# Patient Record
Sex: FEMALE | Race: WHITE | NOT HISPANIC OR LATINO
[De-identification: names, ages, dates, MRNs, and addresses within clinical notes are randomized per-mention and may not be internally consistent; named-entity substitution may affect disease eponyms.]

---

## 2017-03-10 ENCOUNTER — APPOINTMENT (OUTPATIENT)
Dept: FAMILY MEDICINE | Facility: CLINIC | Age: 57
End: 2017-03-10

## 2017-03-10 ENCOUNTER — NON-APPOINTMENT (OUTPATIENT)
Age: 57
End: 2017-03-10

## 2017-03-10 VITALS
DIASTOLIC BLOOD PRESSURE: 62 MMHG | SYSTOLIC BLOOD PRESSURE: 118 MMHG | TEMPERATURE: 98 F | WEIGHT: 145.6 LBS | HEART RATE: 66 BPM | RESPIRATION RATE: 14 BRPM | BODY MASS INDEX: 23.4 KG/M2 | OXYGEN SATURATION: 98 % | HEIGHT: 66 IN

## 2017-03-18 LAB
25(OH)D3 SERPL-MCNC: 44 NG/ML
VIT B12 SERPL-MCNC: 546 PG/ML

## 2017-03-24 ENCOUNTER — APPOINTMENT (OUTPATIENT)
Dept: RADIOLOGY | Facility: IMAGING CENTER | Age: 57
End: 2017-03-24

## 2017-03-24 ENCOUNTER — APPOINTMENT (OUTPATIENT)
Dept: MAMMOGRAPHY | Facility: IMAGING CENTER | Age: 57
End: 2017-03-24

## 2017-03-24 ENCOUNTER — OUTPATIENT (OUTPATIENT)
Dept: OUTPATIENT SERVICES | Facility: HOSPITAL | Age: 57
LOS: 1 days | End: 2017-03-24
Payer: COMMERCIAL

## 2017-03-24 DIAGNOSIS — Z13.820 ENCOUNTER FOR SCREENING FOR OSTEOPOROSIS: ICD-10-CM

## 2017-03-24 DIAGNOSIS — Z12.31 ENCOUNTER FOR SCREENING MAMMOGRAM FOR MALIGNANT NEOPLASM OF BREAST: ICD-10-CM

## 2017-03-24 PROCEDURE — 77063 BREAST TOMOSYNTHESIS BI: CPT

## 2017-03-24 PROCEDURE — 77067 SCR MAMMO BI INCL CAD: CPT

## 2017-03-24 PROCEDURE — 77080 DXA BONE DENSITY AXIAL: CPT

## 2017-03-30 DIAGNOSIS — M81.0 AGE-RELATED OSTEOPOROSIS WITHOUT CURRENT PATHOLOGICAL FRACTURE: ICD-10-CM

## 2017-03-30 DIAGNOSIS — Z12.31 ENCOUNTER FOR SCREENING MAMMOGRAM FOR MALIGNANT NEOPLASM OF BREAST: ICD-10-CM

## 2017-04-07 ENCOUNTER — APPOINTMENT (OUTPATIENT)
Dept: FAMILY MEDICINE | Facility: CLINIC | Age: 57
End: 2017-04-07

## 2017-04-07 VITALS — DIASTOLIC BLOOD PRESSURE: 60 MMHG | TEMPERATURE: 97.8 F | SYSTOLIC BLOOD PRESSURE: 108 MMHG

## 2017-04-07 DIAGNOSIS — Z86.19 PERSONAL HISTORY OF OTHER INFECTIOUS AND PARASITIC DISEASES: ICD-10-CM

## 2017-04-07 DIAGNOSIS — R30.0 DYSURIA: ICD-10-CM

## 2017-04-07 DIAGNOSIS — A60.09 HERPESVIRAL INFECTION OF OTHER UROGENITAL TRACT: ICD-10-CM

## 2017-04-07 DIAGNOSIS — B37.9 CANDIDIASIS, UNSPECIFIED: ICD-10-CM

## 2017-04-07 DIAGNOSIS — Z11.3 ENCOUNTER FOR SCREENING FOR INFECTIONS WITH A PREDOMINANTLY SEXUAL MODE OF TRANSMISSION: ICD-10-CM

## 2017-04-13 LAB
APPEARANCE: CLEAR
BACTERIA UR CULT: NORMAL
BACTERIA: NEGATIVE
BILIRUBIN URINE: NEGATIVE
BLOOD URINE: NEGATIVE
CANDIDA VAG CYTO: NOT DETECTED
COLOR: YELLOW
G VAGINALIS+PREV SP MTYP VAG QL MICRO: NOT DETECTED
GLUCOSE QUALITATIVE U: NORMAL MG/DL
HERPES SIMPLEX 1 AND 2 ABS IGM: 1.76 RATIO
HSV 1+2 IGG SER IA-IMP: NEGATIVE
HSV 1+2 IGG SER IA-IMP: NEGATIVE
HSV1 IGG SER QL: 0.24 INDEX
HSV2 IGG SER QL: 0.05 INDEX
HYALINE CASTS: 0 /LPF
KETONES URINE: NEGATIVE
LEUKOCYTE ESTERASE URINE: NEGATIVE
MICROSCOPIC-UA: NORMAL
NITRITE URINE: NEGATIVE
PH URINE: 7.5
PROTEIN URINE: NEGATIVE MG/DL
RED BLOOD CELLS URINE: 2 /HPF
RPR SER QL: NORMAL
SOURCE AMPLIFICATION: NORMAL
SPECIFIC GRAVITY URINE: 1.02
SQUAMOUS EPITHELIAL CELLS: 2 /HPF
T VAGINALIS RRNA SPEC QL NAA+PROBE: NEGATIVE
T VAGINALIS VAG QL WET PREP: NOT DETECTED
UROBILINOGEN URINE: NORMAL MG/DL
WHITE BLOOD CELLS URINE: 1 /HPF

## 2017-04-19 ENCOUNTER — APPOINTMENT (OUTPATIENT)
Dept: OBGYN | Facility: CLINIC | Age: 57
End: 2017-04-19

## 2017-04-19 VITALS
SYSTOLIC BLOOD PRESSURE: 102 MMHG | BODY MASS INDEX: 23.46 KG/M2 | DIASTOLIC BLOOD PRESSURE: 68 MMHG | HEART RATE: 67 BPM | WEIGHT: 146 LBS | HEIGHT: 66 IN

## 2017-04-19 DIAGNOSIS — Z01.419 ENCOUNTER FOR GYNECOLOGICAL EXAMINATION (GENERAL) (ROUTINE) W/OUT ABNORMAL FINDINGS: ICD-10-CM

## 2017-04-19 DIAGNOSIS — Z87.39 PERSONAL HISTORY OF OTHER DISEASES OF THE MUSCULOSKELETAL SYSTEM AND CONNECTIVE TISSUE: ICD-10-CM

## 2017-04-19 DIAGNOSIS — Z80.8 FAMILY HISTORY OF MALIGNANT NEOPLASM OF OTHER ORGANS OR SYSTEMS: ICD-10-CM

## 2017-04-19 DIAGNOSIS — Z82.49 FAMILY HISTORY OF ISCHEMIC HEART DISEASE AND OTHER DISEASES OF THE CIRCULATORY SYSTEM: ICD-10-CM

## 2017-04-19 DIAGNOSIS — Z80.1 FAMILY HISTORY OF MALIGNANT NEOPLASM OF TRACHEA, BRONCHUS AND LUNG: ICD-10-CM

## 2017-04-19 DIAGNOSIS — Z80.0 FAMILY HISTORY OF MALIGNANT NEOPLASM OF DIGESTIVE ORGANS: ICD-10-CM

## 2017-04-21 LAB — HPV HIGH+LOW RISK DNA PNL CVX: NEGATIVE

## 2017-04-25 LAB — CYTOLOGY CVX/VAG DOC THIN PREP: NORMAL

## 2017-06-29 ENCOUNTER — APPOINTMENT (OUTPATIENT)
Dept: OBGYN | Facility: CLINIC | Age: 57
End: 2017-06-29

## 2017-06-30 ENCOUNTER — CLINICAL ADVICE (OUTPATIENT)
Age: 57
End: 2017-06-30

## 2017-06-30 RX ORDER — CHROMIUM 200 MCG
TABLET ORAL
Refills: 0 | Status: ACTIVE | COMMUNITY

## 2018-03-12 ENCOUNTER — APPOINTMENT (OUTPATIENT)
Dept: FAMILY MEDICINE | Facility: CLINIC | Age: 58
End: 2018-03-12
Payer: COMMERCIAL

## 2018-03-12 ENCOUNTER — NON-APPOINTMENT (OUTPATIENT)
Age: 58
End: 2018-03-12

## 2018-03-12 VITALS
HEIGHT: 65 IN | TEMPERATURE: 98 F | HEART RATE: 60 BPM | SYSTOLIC BLOOD PRESSURE: 120 MMHG | RESPIRATION RATE: 17 BRPM | DIASTOLIC BLOOD PRESSURE: 70 MMHG | WEIGHT: 150 LBS | BODY MASS INDEX: 24.99 KG/M2 | OXYGEN SATURATION: 99 %

## 2018-03-12 DIAGNOSIS — K59.09 OTHER CONSTIPATION: ICD-10-CM

## 2018-03-12 PROCEDURE — 99396 PREV VISIT EST AGE 40-64: CPT | Mod: 25

## 2018-03-12 PROCEDURE — 93000 ELECTROCARDIOGRAM COMPLETE: CPT

## 2018-07-22 PROBLEM — Z80.0 FAMILY HISTORY OF COLON CANCER: Status: ACTIVE | Noted: 2017-04-19

## 2018-07-22 PROBLEM — Z80.8 FAMILY HISTORY OF SKIN CANCER: Status: ACTIVE | Noted: 2017-04-19

## 2018-07-24 ENCOUNTER — APPOINTMENT (OUTPATIENT)
Dept: ENDOCRINOLOGY | Facility: CLINIC | Age: 58
End: 2018-07-24
Payer: COMMERCIAL

## 2018-07-24 VITALS
BODY MASS INDEX: 24.16 KG/M2 | OXYGEN SATURATION: 98 % | HEART RATE: 59 BPM | WEIGHT: 145 LBS | DIASTOLIC BLOOD PRESSURE: 70 MMHG | SYSTOLIC BLOOD PRESSURE: 96 MMHG | HEIGHT: 65 IN

## 2018-07-24 PROCEDURE — 77080 DXA BONE DENSITY AXIAL: CPT

## 2018-07-24 PROCEDURE — 99245 OFF/OP CONSLTJ NEW/EST HI 55: CPT | Mod: 25

## 2018-07-24 RX ORDER — FLUCONAZOLE 150 MG/1
150 TABLET ORAL
Qty: 1 | Refills: 0 | Status: DISCONTINUED | COMMUNITY
Start: 2017-04-07 | End: 2018-07-24

## 2018-07-31 ENCOUNTER — APPOINTMENT (OUTPATIENT)
Dept: ENDOCRINOLOGY | Facility: CLINIC | Age: 58
End: 2018-07-31

## 2018-08-21 ENCOUNTER — APPOINTMENT (OUTPATIENT)
Dept: ENDOCRINOLOGY | Facility: CLINIC | Age: 58
End: 2018-08-21

## 2018-09-26 ENCOUNTER — APPOINTMENT (OUTPATIENT)
Dept: ENDOCRINOLOGY | Facility: CLINIC | Age: 58
End: 2018-09-26

## 2018-10-29 ENCOUNTER — APPOINTMENT (OUTPATIENT)
Dept: ENDOCRINOLOGY | Facility: CLINIC | Age: 58
End: 2018-10-29

## 2018-11-27 ENCOUNTER — APPOINTMENT (OUTPATIENT)
Dept: ENDOCRINOLOGY | Facility: CLINIC | Age: 58
End: 2018-11-27
Payer: COMMERCIAL

## 2018-11-27 PROCEDURE — 99213 OFFICE O/P EST LOW 20 MIN: CPT

## 2018-11-27 RX ORDER — VALACYCLOVIR 500 MG/1
500 TABLET, FILM COATED ORAL
Qty: 20 | Refills: 0 | Status: DISCONTINUED | COMMUNITY
Start: 2017-04-07 | End: 2018-11-27

## 2018-11-27 RX ORDER — TERCONAZOLE 8 MG/G
0.8 CREAM VAGINAL
Qty: 1 | Refills: 0 | Status: DISCONTINUED | COMMUNITY
Start: 2017-04-07 | End: 2018-11-27

## 2018-12-20 ENCOUNTER — APPOINTMENT (OUTPATIENT)
Dept: ENDOCRINOLOGY | Facility: CLINIC | Age: 58
End: 2018-12-20
Payer: SUBSIDIZED

## 2018-12-20 PROCEDURE — 99212 OFFICE O/P EST SF 10 MIN: CPT

## 2019-01-23 ENCOUNTER — APPOINTMENT (OUTPATIENT)
Dept: ENDOCRINOLOGY | Facility: CLINIC | Age: 59
End: 2019-01-23
Payer: SUBSIDIZED

## 2019-01-23 PROCEDURE — 99212 OFFICE O/P EST SF 10 MIN: CPT

## 2019-01-23 NOTE — END OF VISIT
[FreeTextEntry3] : I, Guillermina Lara, authored this note working as a medical scribe for Dr. Messina.  01/23/2019. 12:15PM. \par This note was authored by Guillermina Lara working as medical scribe for me. I have reviewed, edited, and revised the note as needed. I am in agreement with the exam findings, imaging findings, and treatment plan.  Everardo Messina MD

## 2019-01-23 NOTE — HISTORY OF PRESENT ILLNESS
[FreeTextEntry1] : 58 year old female with osteoporosis. \par \par  arrived for [employer] M[employer] Research study- visit. \par Exercising 5x/wk. She intentionally lost 5 lbs. She feels slightly weaker in her muscles when she is exercising. I would not make any changes/ modifications based on this. \par She was told of periodontal pockets at last DDS. \par Pt takes Ca and Vit D daily as consistent with protocol. \par Up to date with gyn and mammos. \par \par Last dose of Romosozumab administered today. No complications. \par f/u in Feb with labs and BMD. \par

## 2019-02-19 ENCOUNTER — APPOINTMENT (OUTPATIENT)
Dept: ENDOCRINOLOGY | Facility: CLINIC | Age: 59
End: 2019-02-19
Payer: COMMERCIAL

## 2019-02-19 VITALS
WEIGHT: 146 LBS | SYSTOLIC BLOOD PRESSURE: 120 MMHG | TEMPERATURE: 98.1 F | BODY MASS INDEX: 24.03 KG/M2 | HEART RATE: 61 BPM | DIASTOLIC BLOOD PRESSURE: 70 MMHG | RESPIRATION RATE: 17 BRPM | HEIGHT: 65.5 IN | OXYGEN SATURATION: 98 %

## 2019-02-19 PROCEDURE — ZZZZZ: CPT

## 2019-02-19 PROCEDURE — 99214 OFFICE O/P EST MOD 30 MIN: CPT | Mod: 25

## 2019-02-19 PROCEDURE — 77080 DXA BONE DENSITY AXIAL: CPT

## 2019-02-19 NOTE — HISTORY OF PRESENT ILLNESS
[Vitamin D (supplements)] : Vitamin D as a dietary supplement [Family History of Osteoporosis] : family history of osteoporosis [Regular Dental Follow-Up] : regular dental follow-up [FreeTextEntry1] : 58 year old female with osteoporosis. \par \par Routine bone density indicated osteoporosis March 2017 Spine: -3.3 Total hip: -2.3 Fem Neck: -1.9, images reviewed. Pt saw  Dr. Sade Peralta and was told not to start any medication. No hx of fx. FHx of severe osteoporosis (mother, sister). Mother had multiple fx and bone CA. Sister is on Forteo. Pt on Bess trial study since July 2018. Took correctly. Tolerated well. No local irritation. She feels less achy over all. On Vit D and Ca. Sl wt loss after she felt lack of wt change despite exercising 5x/week. No interval fx. Protocol completed. \par  [Low Calcium Intake] : no past or present history of low calcium intake [Low Vit D Intake/Exposure] : no past or present history of low vitamin D intake [Taking Steroids] : no past or present history of taking steroids [Kidney Stones] : no history of kidney stones [High Fall Risk] : no fall risk [Family History of Hip Fracture] : no family history of hip fracture [Previous Fragility Fracture] : no previous fragility fracture

## 2019-02-19 NOTE — ASSESSMENT
[FreeTextEntry1] : 58 year old female with osteoporosis. \par \par Routine bone density indicated decrease in bone density consistent with osteoporosis. March 2017 Spine: -3.3 Total hip: -2.3 Fem Neck: -1.9, images reviewed. BMD 7/2018 indicates worsening osteoporosis, severe in the proximal radius and spine and moderately low in the hip. No h/o fx. FHx of severe osteoporosis (mother, sister) with multiple fx and bone CA. Pt has been part of the Bess trial since July 2018. Tolerated well. Repeat BMD 2/2019 indicates trending improvement in all sites. Completed Protocol. No interval fx. \par \par Options of osteoporosis rx to transition to discussed. Information given to read about standard orl bisphosphonate treatment. Risks and benefits discussed. All questions were answered. f/u in 3 mons to further discuss long term treatment options.

## 2019-02-19 NOTE — PROCEDURE
[FreeTextEntry1] : Bone mineral density: 02/19/2019 \par Indication: vs. 2018, assess response to medication for research\par Spine: -3.0 osteoporosis (+9.4%)\par Total hip: -2.2 osteopenia (+4.1%)\par Femoral neck: -1.8 osteopenia (+3.8%)\par Proximal radius: -3.3 osteoporosis (+3%)\par \par Bone mineral density: 07/24/2018 \par Spine: -3.5 osteoporosis \par Total hip: -2.5 osteoporosis \par Femoral neck: -2.0 osteopenia \par Proximal radius: - 3.6 osteoporosis

## 2019-02-19 NOTE — END OF VISIT
[FreeTextEntry3] : I, Guillermina Lara, authored this note working as a medical scribe for Dr. Messina.  02/19/2019. 12:00PM. \par This note was authored by Guillermina Lara working as medical scribe for me. I have reviewed, edited, and revised the note as needed. I am in agreement with the exam findings, imaging findings, and treatment plan.  Everardo Messina MD

## 2019-05-22 ENCOUNTER — APPOINTMENT (OUTPATIENT)
Dept: ENDOCRINOLOGY | Facility: CLINIC | Age: 59
End: 2019-05-22

## 2019-06-12 ENCOUNTER — APPOINTMENT (OUTPATIENT)
Dept: ENDOCRINOLOGY | Facility: CLINIC | Age: 59
End: 2019-06-12

## 2019-08-08 ENCOUNTER — APPOINTMENT (OUTPATIENT)
Dept: FAMILY MEDICINE | Facility: CLINIC | Age: 59
End: 2019-08-08
Payer: COMMERCIAL

## 2019-08-08 ENCOUNTER — NON-APPOINTMENT (OUTPATIENT)
Age: 59
End: 2019-08-08

## 2019-08-08 VITALS
HEART RATE: 71 BPM | TEMPERATURE: 98.1 F | OXYGEN SATURATION: 97 % | WEIGHT: 153 LBS | RESPIRATION RATE: 17 BRPM | SYSTOLIC BLOOD PRESSURE: 100 MMHG | HEIGHT: 65.35 IN | DIASTOLIC BLOOD PRESSURE: 70 MMHG | BODY MASS INDEX: 25.19 KG/M2

## 2019-08-08 PROCEDURE — 93000 ELECTROCARDIOGRAM COMPLETE: CPT

## 2019-08-08 PROCEDURE — 99396 PREV VISIT EST AGE 40-64: CPT | Mod: 25

## 2019-08-08 NOTE — ADDENDUM
[FreeTextEntry1] : I, Tennille Yañez, acted solely as a scribe for Dr. Reyes on this date 08/08/2019.\par \par All medical record entries made by the Scribe were at my, Dr. Reyes, direction and personally dictated by me on 08/08/2019. I have reviewed the chart and agree that the record accurately reflects my personal performance of the history, physical exam, assessment and plan.  I have also personally directed, reviewed and agreed with the chart.

## 2019-08-08 NOTE — REVIEW OF SYSTEMS
[Negative] : Neurological [Fever] : no fever [Chills] : no chills [Chest Pain] : no chest pain [Shortness Of Breath] : no shortness of breath [Nausea] : no nausea [Diarrhea] : diarrhea [Vomiting] : no vomiting [FreeTextEntry4] : itchy ears

## 2019-08-08 NOTE — PHYSICAL EXAM
[Normal] : normal gait, coordination grossly intact, no focal deficits [de-identified] : Warm, dry, intact.  No rashes.  [de-identified] : AA&Ox3

## 2019-08-08 NOTE — HISTORY OF PRESENT ILLNESS
[de-identified] : 59y/o F with PMHx of Osteoporosis presents to the office for routine annual physical exam. Pt f/u with Endo recently, started Romosozumab with improvement. Pt exercises and watches her diet. Pt c/o itchy ears, has tried oil without improvement. Pt recently started Triphala and presents with improvement to previous bowel issues. All routine labs reviewed with patient and WNL except for Vitamin D 38. BP in office is 100/70, Bglu is 89 and HbA1c is 5.2. Last colonoscopy 2015, last mammo UTD, last pap UTD and last bone density UTD. Denies HA, CP, SOB, N/V/D, fever or chills. NKDA.

## 2020-04-02 ENCOUNTER — APPOINTMENT (OUTPATIENT)
Dept: ENDOCRINOLOGY | Facility: CLINIC | Age: 60
End: 2020-04-02

## 2020-09-22 ENCOUNTER — LABORATORY RESULT (OUTPATIENT)
Age: 60
End: 2020-09-22

## 2020-09-22 ENCOUNTER — NON-APPOINTMENT (OUTPATIENT)
Age: 60
End: 2020-09-22

## 2020-09-22 ENCOUNTER — APPOINTMENT (OUTPATIENT)
Dept: FAMILY MEDICINE | Facility: CLINIC | Age: 60
End: 2020-09-22
Payer: COMMERCIAL

## 2020-09-22 VITALS
HEART RATE: 56 BPM | BODY MASS INDEX: 24.76 KG/M2 | SYSTOLIC BLOOD PRESSURE: 106 MMHG | OXYGEN SATURATION: 97 % | WEIGHT: 150.4 LBS | DIASTOLIC BLOOD PRESSURE: 60 MMHG | HEIGHT: 65.35 IN | RESPIRATION RATE: 14 BRPM | TEMPERATURE: 98.1 F

## 2020-09-22 DIAGNOSIS — Z00.00 ENCOUNTER FOR GENERAL ADULT MEDICAL EXAMINATION W/OUT ABNORMAL FINDINGS: ICD-10-CM

## 2020-09-22 DIAGNOSIS — M79.10 MYALGIA, UNSPECIFIED SITE: ICD-10-CM

## 2020-09-22 PROCEDURE — 36415 COLL VENOUS BLD VENIPUNCTURE: CPT

## 2020-09-22 PROCEDURE — 99396 PREV VISIT EST AGE 40-64: CPT | Mod: 25

## 2020-09-22 PROCEDURE — 93000 ELECTROCARDIOGRAM COMPLETE: CPT

## 2020-09-22 NOTE — HISTORY OF PRESENT ILLNESS
[de-identified] : 58 y/o F PMHx Osteoporosis (Actonel) presents to office for routine PE.Pt c/o muscle tightness .Admits to "lymphatic stiffness".  pt does Yoga and meditation daily. Requests Lyme test as she had Lyme 10 years ago and had a rash few weeks ago. Unsure of bit by tick

## 2020-09-22 NOTE — HEALTH RISK ASSESSMENT
[Patient reported PAP Smear was normal] : Patient reported PAP Smear was normal [PapSmearDate] : 9/20

## 2020-09-30 LAB
25(OH)D3 SERPL-MCNC: 41.1 NG/ML
ALBUMIN SERPL ELPH-MCNC: 4.8 G/DL
ALP BLD-CCNC: 62 U/L
ALT SERPL-CCNC: 13 U/L
ANION GAP SERPL CALC-SCNC: 10 MMOL/L
AST SERPL-CCNC: 11 U/L
B BURGDOR IGG+IGM SER QL IB: NORMAL
BASOPHILS # BLD AUTO: 0.04 K/UL
BASOPHILS NFR BLD AUTO: 0.7 %
BILIRUB SERPL-MCNC: 0.4 MG/DL
BUN SERPL-MCNC: 13 MG/DL
CALCIUM SERPL-MCNC: 9.8 MG/DL
CHLORIDE SERPL-SCNC: 103 MMOL/L
CHOLEST SERPL-MCNC: 214 MG/DL
CHOLEST/HDLC SERPL: 2.9 RATIO
CO2 SERPL-SCNC: 24 MMOL/L
CREAT SERPL-MCNC: 0.81 MG/DL
EOSINOPHIL # BLD AUTO: 0.08 K/UL
EOSINOPHIL NFR BLD AUTO: 1.4 %
ERYTHROCYTE [SEDIMENTATION RATE] IN BLOOD BY WESTERGREN METHOD: < 2 MM/HR
ESTIMATED AVERAGE GLUCOSE: 108 MG/DL
FOLATE SERPL-MCNC: 17.1 NG/ML
GLUCOSE SERPL-MCNC: 94 MG/DL
HBA1C MFR BLD HPLC: 5.4 %
HCT VFR BLD CALC: 40.9 %
HDLC SERPL-MCNC: 73 MG/DL
HGB BLD-MCNC: 13.2 G/DL
IMM GRANULOCYTES NFR BLD AUTO: 0.2 %
LDLC SERPL CALC-MCNC: 129 MG/DL
LYMPHOCYTES # BLD AUTO: 2.1 K/UL
LYMPHOCYTES NFR BLD AUTO: 36.4 %
MAN DIFF?: NORMAL
MCHC RBC-ENTMCNC: 30.3 PG
MCHC RBC-ENTMCNC: 32.3 GM/DL
MCV RBC AUTO: 93.8 FL
MONOCYTES # BLD AUTO: 0.46 K/UL
MONOCYTES NFR BLD AUTO: 8 %
NEUTROPHILS # BLD AUTO: 3.08 K/UL
NEUTROPHILS NFR BLD AUTO: 53.3 %
PLATELET # BLD AUTO: 260 K/UL
POTASSIUM SERPL-SCNC: 4.2 MMOL/L
PROT SERPL-MCNC: 7.3 G/DL
RBC # BLD: 4.36 M/UL
RBC # FLD: 12.7 %
SODIUM SERPL-SCNC: 137 MMOL/L
TRIGL SERPL-MCNC: 61 MG/DL
TSH SERPL-ACNC: 1.62 UIU/ML
VIT B12 SERPL-MCNC: 713 PG/ML
WBC # FLD AUTO: 5.77 K/UL

## 2020-12-15 PROBLEM — Z86.19 HISTORY OF CANDIDAL VULVOVAGINITIS: Status: RESOLVED | Noted: 2017-04-07 | Resolved: 2020-12-15

## 2021-01-05 ENCOUNTER — APPOINTMENT (OUTPATIENT)
Dept: ENDOCRINOLOGY | Facility: CLINIC | Age: 61
End: 2021-01-05
Payer: COMMERCIAL

## 2021-01-05 VITALS
TEMPERATURE: 97.4 F | HEART RATE: 59 BPM | BODY MASS INDEX: 25.16 KG/M2 | DIASTOLIC BLOOD PRESSURE: 68 MMHG | HEIGHT: 65 IN | SYSTOLIC BLOOD PRESSURE: 110 MMHG | WEIGHT: 151 LBS | OXYGEN SATURATION: 98 %

## 2021-01-05 PROCEDURE — 99072 ADDL SUPL MATRL&STAF TM PHE: CPT

## 2021-01-05 PROCEDURE — ZZZZZ: CPT

## 2021-01-05 PROCEDURE — 99214 OFFICE O/P EST MOD 30 MIN: CPT | Mod: 25

## 2021-01-05 PROCEDURE — 77080 DXA BONE DENSITY AXIAL: CPT

## 2021-01-06 NOTE — HISTORY OF PRESENT ILLNESS
[Vitamin D (supplements)] : Vitamin D as a dietary supplement [Family History of Osteoporosis] : family history of osteoporosis [Regular Dental Follow-Up] : regular dental follow-up [FreeTextEntry1] : Last seen 2/2019. \par Routine bone density indicated osteoporosis March 2017 Spine: -3.3 Total hip: -2.3 Fem Neck: -1.9, images reviewed.  No hx of fx. FHx of severe osteoporosis (mother, sister). Mother had multiple fx and bone CA. Sister is on Forteo. Pt on Bess trial study since July 2018. Took correctly. Tolerated well. No local irritation. She feels less achy over all. On Vit D and Ca. Sl wt loss after she felt lack of wt change despite exercising 5x/week. No interval fx. Protocol completed. \par Began risedronate 2/2019 taking correctly tolerating well No UGI sx. No thigh pain. Last DDS one  mos ago. No interval fractures. \par  [Low Calcium Intake] : no past or present history of low calcium intake [Low Vit D Intake/Exposure] : no past or present history of low vitamin D intake [Taking Steroids] : no past or present history of taking steroids [Kidney Stones] : no history of kidney stones [High Fall Risk] : no fall risk [Family History of Hip Fracture] : no family history of hip fracture [Previous Fragility Fracture] : no previous fragility fracture

## 2021-01-06 NOTE — PHYSICAL EXAM
[Normal Oropharynx] : the oropharynx was normal [No Respiratory Distress] : no respiratory distress [No Accessory Muscle Use] : no accessory muscle use [Pedal Pulses Normal] : the pedal pulses are present [Normal Posterior Cervical Nodes] : no posterior cervical lymphadenopathy [Normal Strength/Tone] : muscle strength and tone were normal [Alert] : alert [Well Nourished] : well nourished [No Acute Distress] : no acute distress [Well Developed] : well developed [Normal Sclera/Conjunctiva] : normal sclera/conjunctiva [EOMI] : extra ocular movement intact [No Proptosis] : no proptosis [Thyroid Not Enlarged] : the thyroid was not enlarged [No Thyroid Nodules] : no palpable thyroid nodules [Clear to Auscultation] : lungs were clear to auscultation bilaterally [Normal S1, S2] : normal S1 and S2 [Normal Rate] : heart rate was normal [Regular Rhythm] : with a regular rhythm [No Edema] : no peripheral edema [Normal Bowel Sounds] : normal bowel sounds [Not Tender] : non-tender [Not Distended] : not distended [Soft] : abdomen soft [Normal Anterior Cervical Nodes] : no anterior cervical lymphadenopathy [No Spinal Tenderness] : no spinal tenderness [Spine Straight] : spine straight [No Stigmata of Cushings Syndrome] : no stigmata of Cushings Syndrome [Normal Gait] : normal gait [No Rash] : no rash [Normal Reflexes] : deep tendon reflexes were 2+ and symmetric [No Tremors] : no tremors [Oriented x3] : oriented to person, place, and time [Acanthosis Nigricans] : no acanthosis nigricans [Negative] : Heme/Lymph

## 2021-01-06 NOTE — PHYSICAL EXAM
[Normal Oropharynx] : the oropharynx was normal [No Respiratory Distress] : no respiratory distress [No Accessory Muscle Use] : no accessory muscle use [Pedal Pulses Normal] : the pedal pulses are present [Normal Posterior Cervical Nodes] : no posterior cervical lymphadenopathy [Normal Strength/Tone] : muscle strength and tone were normal [Alert] : alert [Well Nourished] : well nourished [No Acute Distress] : no acute distress [Well Developed] : well developed [Normal Sclera/Conjunctiva] : normal sclera/conjunctiva [EOMI] : extra ocular movement intact [No Proptosis] : no proptosis [Thyroid Not Enlarged] : the thyroid was not enlarged [No Thyroid Nodules] : no palpable thyroid nodules [Clear to Auscultation] : lungs were clear to auscultation bilaterally [Normal S1, S2] : normal S1 and S2 [Normal Rate] : heart rate was normal [Regular Rhythm] : with a regular rhythm [No Edema] : no peripheral edema [Normal Bowel Sounds] : normal bowel sounds [Not Tender] : non-tender [Not Distended] : not distended [Soft] : abdomen soft [Normal Anterior Cervical Nodes] : no anterior cervical lymphadenopathy [No Spinal Tenderness] : no spinal tenderness [Spine Straight] : spine straight [No Stigmata of Cushings Syndrome] : no stigmata of Cushings Syndrome [Normal Gait] : normal gait [No Rash] : no rash [Normal Reflexes] : deep tendon reflexes were 2+ and symmetric [No Tremors] : no tremors [Oriented x3] : oriented to person, place, and time [Acanthosis Nigricans] : no acanthosis nigricans

## 2021-01-06 NOTE — PROCEDURE
[FreeTextEntry1] : Bone mineral density January 5, 2021\par indication: Compared to 2019\par spine spine -3.1 osteoporosis no significant change\par total hip -2.3 osteopenia no significant change\par femoral neck -1.9 osteopenia no significant change\par proximal radius -3.2 osteoporosis no significant change\par \par Bone mineral density: 02/19/2019 \par Indication: vs. 2018, assess response to medication for research\par Spine: -3.0 osteoporosis (+9.4%)\par Total hip: -2.2 osteopenia (+4.1%)\par Femoral neck: -1.8 osteopenia (+3.8%)\par Proximal radius: -3.3 osteoporosis (+3%)\par \par Bone mineral density: 07/24/2018 \par Spine: -3.5 osteoporosis \par Total hip: -2.5 osteoporosis \par Femoral neck: -2.0 osteopenia \par Proximal radius: - 3.6 osteoporosis

## 2021-01-06 NOTE — ASSESSMENT
[FreeTextEntry1] : 60 year old female with h/o severe  osteoporosis. \par \par Routine bone density indicated decrease in bone density consistent with osteoporosis. March 2017 Spine: -3.3 Total hip: -2.3 Fem Neck: -1.9, images reviewed. BMD 7/2018 indicates worsening osteoporosis, severe in the proximal radius and spine and moderately low in the hip. No h/o fx. FHx of severe osteoporosis (mother, sister) with multiple fx and bone CA. Pt has been part of the Bess trial since July 2018. Tolerated well. Repeat BMD 2/2019 indicates trending improvement in all sites. Completed Protocol. No interval fx. \par Began risedronate which she is taking correctly and tolerating well.\par Repeat bone density 2021 is essentially stable after initial improvement especially spine while on research protocol.  Options of therapy discussed in great detail.  Patient elects to continue risedronate.

## 2021-01-21 ENCOUNTER — APPOINTMENT (OUTPATIENT)
Dept: OPHTHALMOLOGY | Facility: CLINIC | Age: 61
End: 2021-01-21
Payer: COMMERCIAL

## 2021-01-21 ENCOUNTER — NON-APPOINTMENT (OUTPATIENT)
Age: 61
End: 2021-01-21

## 2021-01-21 PROCEDURE — 99072 ADDL SUPL MATRL&STAF TM PHE: CPT

## 2021-01-21 PROCEDURE — 92014 COMPRE OPH EXAM EST PT 1/>: CPT

## 2021-01-21 PROCEDURE — 92015 DETERMINE REFRACTIVE STATE: CPT

## 2021-02-02 ENCOUNTER — APPOINTMENT (OUTPATIENT)
Dept: FAMILY MEDICINE | Facility: CLINIC | Age: 61
End: 2021-02-02
Payer: COMMERCIAL

## 2021-02-02 DIAGNOSIS — B02.9 ZOSTER W/OUT COMPLICATIONS: ICD-10-CM

## 2021-02-02 PROCEDURE — 99212 OFFICE O/P EST SF 10 MIN: CPT | Mod: 95

## 2021-02-02 NOTE — PHYSICAL EXAM
[Normal] : affect was normal and insight and judgment were intact [de-identified] : maculaopapaular rash with some vesicles non oozing in RUQ right side and right mid back

## 2021-02-02 NOTE — REVIEW OF SYSTEMS
[Itching] : Itching [Skin Rash] : skin rash [Negative] : Psychiatric [de-identified] : painful rash RUQ,Right side right upper mid back

## 2021-02-02 NOTE — HISTORY OF PRESENT ILLNESS
[Home] : at home, [unfilled] , at the time of the visit. [Medical Office: ([facility])___] : at the medical office located in  [Verbal consent obtained from patient] : the patient, [unfilled] [FreeTextEntry8] : 61 y/o F Presents via Telehealth c/o RUQ pain for few days.Pt went to ER had CT scan WNL.The next day rash came outon right upper abdomen and now spread .Pt was diagnosed with Shingles.Started on Valtrex day 3 (7 doses). Now rash has spread to right mid side and right upper back. No oozing

## 2021-02-11 DIAGNOSIS — B02.29 OTHER POSTHERPETIC NERVOUS SYSTEM INVOLVEMENT: ICD-10-CM

## 2021-02-11 RX ORDER — LIDOCAINE 42 MG/1
4 PATCH PERCUTANEOUS; TOPICAL; TRANSDERMAL
Qty: 5 | Refills: 0 | Status: ACTIVE | COMMUNITY
Start: 2021-02-11 | End: 1900-01-01

## 2021-07-09 ENCOUNTER — NON-APPOINTMENT (OUTPATIENT)
Age: 61
End: 2021-07-09

## 2021-07-26 ENCOUNTER — APPOINTMENT (OUTPATIENT)
Dept: FAMILY MEDICINE | Facility: CLINIC | Age: 61
End: 2021-07-26
Payer: COMMERCIAL

## 2021-07-26 VITALS
OXYGEN SATURATION: 98 % | DIASTOLIC BLOOD PRESSURE: 78 MMHG | SYSTOLIC BLOOD PRESSURE: 106 MMHG | RESPIRATION RATE: 12 BRPM | TEMPERATURE: 98 F | HEART RATE: 57 BPM

## 2021-07-26 DIAGNOSIS — L29.9 PRURITUS, UNSPECIFIED: ICD-10-CM

## 2021-07-26 PROCEDURE — 99072 ADDL SUPL MATRL&STAF TM PHE: CPT

## 2021-07-26 PROCEDURE — 99213 OFFICE O/P EST LOW 20 MIN: CPT

## 2021-07-26 NOTE — HISTORY OF PRESENT ILLNESS
[FreeTextEntry8] : 61 yo female presents to the office for inner ear itching. the patient reports that two weeks ago, she was seen at an urgent care for inner ear congestion/muffled hearing, and was prescribed antibiotic ear drops for 'swimmers ear'. the patient states that her symptoms have mostly resolved, but she still has occasional ear itching. she has been using flonase once a day, but is wondering if there's anything else she can use in addition to help the itching.

## 2021-07-26 NOTE — REVIEW OF SYSTEMS
[Postnasal Drip] : postnasal drip [Negative] : Psychiatric [Earache] : no earache [Hearing Loss] : no hearing loss [Nosebleed] : no nosebleeds [Hoarseness] : no hoarseness [Nasal Discharge] : no nasal discharge [Sore Throat] : no sore throat [FreeTextEntry4] : inner ear itching

## 2021-07-26 NOTE — PHYSICAL EXAM
[Normal TMs] : both tympanic membranes were normal [No Lymphadenopathy] : no lymphadenopathy [Normal Supraclavicular Nodes] : no supraclavicular lymphadenopathy [Coordination Grossly Intact] : coordination grossly intact [No Focal Deficits] : no focal deficits [Normal Gait] : normal gait [Speech Grossly Normal] : speech grossly normal [Alert and Oriented x3] : oriented to person, place, and time [Normal Mood] : the mood was normal [Normal] : affect was normal and insight and judgment were intact

## 2021-11-02 ENCOUNTER — APPOINTMENT (OUTPATIENT)
Dept: FAMILY MEDICINE | Facility: CLINIC | Age: 61
End: 2021-11-02
Payer: COMMERCIAL

## 2021-11-02 VITALS — SYSTOLIC BLOOD PRESSURE: 110 MMHG | TEMPERATURE: 95.5 F | DIASTOLIC BLOOD PRESSURE: 62 MMHG

## 2021-11-02 DIAGNOSIS — M79.2 NEURALGIA AND NEURITIS, UNSPECIFIED: ICD-10-CM

## 2021-11-02 DIAGNOSIS — S03.00XA DISLOCATION OF JAW, UNSPECIFIED SIDE, INITIAL ENCOUNTER: ICD-10-CM

## 2021-11-02 PROCEDURE — 99215 OFFICE O/P EST HI 40 MIN: CPT

## 2021-11-02 PROCEDURE — 99072 ADDL SUPL MATRL&STAF TM PHE: CPT

## 2021-11-02 NOTE — PHYSICAL EXAM
[Normal] : affect was normal and insight and judgment were intact [de-identified] : ++TMJ Click R>.L [de-identified] : decreased sensation to  pinprck left side of face

## 2021-11-02 NOTE — REVIEW OF SYSTEMS
[Muscle Pain] : muscle pain [Negative] : Respiratory [FreeTextEntry4] : TMJ issues R>L, left sided nasal swelling [FreeTextEntry9] : neck pain [de-identified] : numbness tingling sensation in face  L>R
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
## 2021-11-08 ENCOUNTER — APPOINTMENT (OUTPATIENT)
Dept: FAMILY MEDICINE | Facility: CLINIC | Age: 61
End: 2021-11-08
Payer: COMMERCIAL

## 2021-11-08 PROCEDURE — 99443: CPT

## 2021-11-18 ENCOUNTER — APPOINTMENT (OUTPATIENT)
Dept: FAMILY MEDICINE | Facility: CLINIC | Age: 61
End: 2021-11-18
Payer: COMMERCIAL

## 2021-11-18 PROCEDURE — 99443: CPT

## 2021-11-23 ENCOUNTER — APPOINTMENT (OUTPATIENT)
Dept: FAMILY MEDICINE | Facility: CLINIC | Age: 61
End: 2021-11-23

## 2021-12-30 RX ORDER — RISEDRONATE SODIUM 150 MG/1
150 TABLET, FILM COATED ORAL
Qty: 3 | Refills: 3 | Status: ACTIVE | COMMUNITY
Start: 2020-04-01 | End: 1900-01-01

## 2022-03-17 ENCOUNTER — APPOINTMENT (OUTPATIENT)
Dept: ENDOCRINOLOGY | Facility: CLINIC | Age: 62
End: 2022-03-17
Payer: SELF-PAY

## 2022-03-17 VITALS
HEART RATE: 61 BPM | WEIGHT: 143 LBS | DIASTOLIC BLOOD PRESSURE: 62 MMHG | SYSTOLIC BLOOD PRESSURE: 112 MMHG | BODY MASS INDEX: 23.82 KG/M2 | OXYGEN SATURATION: 98 % | TEMPERATURE: 97 F | HEIGHT: 65 IN

## 2022-03-17 DIAGNOSIS — M81.0 AGE-RELATED OSTEOPOROSIS W/OUT CURRENT PATHOLOGICAL FRACTURE: ICD-10-CM

## 2022-03-17 PROCEDURE — 99213 OFFICE O/P EST LOW 20 MIN: CPT | Mod: 25

## 2022-03-17 PROCEDURE — 77080 DXA BONE DENSITY AXIAL: CPT

## 2022-03-17 PROCEDURE — ZZZZZ: CPT

## 2022-03-17 PROCEDURE — 99072 ADDL SUPL MATRL&STAF TM PHE: CPT

## 2022-03-17 RX ORDER — PREDNISONE 10 MG/1
10 TABLET ORAL
Qty: 25 | Refills: 0 | Status: DISCONTINUED | COMMUNITY
Start: 2021-11-02 | End: 2022-03-17

## 2022-03-17 RX ORDER — DIAZEPAM 2 MG/1
2 TABLET ORAL DAILY
Qty: 10 | Refills: 0 | Status: DISCONTINUED | COMMUNITY
Start: 2021-11-02 | End: 2022-03-17

## 2022-03-17 RX ORDER — MOMETASONE FUROATE 1 MG/ML
0.1 SOLUTION TOPICAL
Qty: 1 | Refills: 0 | Status: DISCONTINUED | COMMUNITY
Start: 2021-07-26 | End: 2022-03-17

## 2022-03-17 RX ORDER — GABAPENTIN 100 MG/1
100 CAPSULE ORAL 3 TIMES DAILY
Qty: 90 | Refills: 5 | Status: DISCONTINUED | COMMUNITY
Start: 2021-02-11 | End: 2022-03-17

## 2022-03-17 NOTE — END OF VISIT
[FreeTextEntry3] : I, Sam Harris, authored this note working as a medical scribe for Dr. Messina.  03/17/2022.  2:45PM. This note was authored by the medical scribe for me. I have reviewed, edited, and revised the note as needed. I am in agreement with the exam findings, imaging findings, and treatment plan.  Everardo Messina MD

## 2022-03-17 NOTE — HISTORY OF PRESENT ILLNESS
[Romosozumab (Evenity)] : Romosozumab [Risedronate (Actonel)] : Risedronate [FreeTextEntry1] : No significant interval health changes. No interval surgery, hospitalizations, fractures, or change in medications.\par \par Routine BMD 3/2017 indicated osteoporosis Spine: -3.3 Total hip: -2.3 Fem Neck: -1.9, images reviewed. No h/o fx. Fh/o severe osteoporosis (mother, sister). Mother had multiple fx and bone CA. Sister was on Forteo. Pt previously was on Bess trial study since 7/2018. Took correctly. Tolerated well. No local irritation. She felt achy over all. Protocol completed. Began Risedronate 2/2019. Taking correctly, tolerating well. No interval fx, no UGI sx, no thigh pain. No aches & pains. No heartburn. Last DDS within past 6 months. No ONJ. Not planning major dental work. BMD 1/2021 is essentially stable after initial improvement especially spine while on research protocol. \par \par Had shingles 1/2021, resolved.\par \par Pt moved to [place], [place].

## 2022-03-17 NOTE — ASSESSMENT
[Denosumab Therapy] : Risks  and benefits of denosumab therapy were discussed with the patient including eczema, cellulitis, osteonecrosis of the jaw and atypical femur fractures [FreeTextEntry1] : 61 year old female with h/o severe osteoporosis\par \par Routine bone density indicated decrease in bone density consistent with osteoporosis. March 2017 Spine: -3.3 Total hip: -2.3 Fem Neck: -1.9, images reviewed. BMD 7/2018 indicates worsening osteoporosis, severe in the proximal radius and spine and moderately low in the hip. No h/o fx. Fh/o severe osteoporosis (mother, sister) with multiple fx and bone CA. Pt was previously in the Bess trial since 7/2018. Tolerated well. BMD 2/2019 indicates trending improvement in all sites. Completed Protocol. Began Risedronate 2/2019. Taking correctly, tolerating well. No interval fx, no UGI sx, no thigh pain. No aches & pains. No heartburn. No ONJ. BMD 1/2021 is essentially stable after initial improvement especially spine while on research protocol. BMD 3/2022 indicates stable osteoporosis in spine and proximal radius, slightly worsened now osteoporosis in total hip, and stable osteopenia in femoral neck. BMD results reviewed w/ pt.\par \par Patient advised for an increased risk of future fx. Options for medical therapy discussed in detail including c/w Actonel vs. transitioning to twice a year Prolia. Risks and benefits discussed including thigh pain, interval fx, and ONJ. I discussed that pt cannot stop Prolia without expecting rapid bone loss and increase in risk for future fx unless they transition to another rx. Furthermore, there is 10 year safety data for Prolia. All questions were answered. Rx information handout provided. Pt elects to transition to Prolia. Will investigate insurance coverage and if pt can get rx in [place].\par \par Request labs sent out.\par \par F/u TBD

## 2022-03-17 NOTE — PROCEDURE
[FreeTextEntry1] : Bone mineral density: 03/17/2022 \par Indication: vs. 2021 assess response to medication\par Spine: -3.2 osteoporosis, no significant change\par Total hip: -2.5 osteoporosis, -4.5%\par Femoral neck: -1.9 osteopenia, no significant change\par Proximal radius: -3.2 osteoporosis, no significant change\par \par Bone mineral density January 5, 2021\par indication: Compared to 2019\par spine spine -3.1 osteoporosis no significant change\par total hip -2.3 osteopenia no significant change\par femoral neck -1.9 osteopenia no significant change\par proximal radius -3.2 osteoporosis no significant change\par \par Bone mineral density: 02/19/2019 \par Indication: vs. 2018, assess response to medication for research\par Spine: -3.0 osteoporosis (+9.4%)\par Total hip: -2.2 osteopenia (+4.1%)\par Femoral neck: -1.8 osteopenia (+3.8%)\par Proximal radius: -3.3 osteoporosis (+3%)\par \par Bone mineral density: 07/24/2018 \par Spine: -3.5 osteoporosis \par Total hip: -2.5 osteoporosis \par Femoral neck: -2.0 osteopenia \par Proximal radius: - 3.6 osteoporosis

## 2022-03-18 LAB
25(OH)D3 SERPL-MCNC: 37.4 NG/ML
ALBUMIN SERPL ELPH-MCNC: 4.7 G/DL
ALP BLD-CCNC: 64 U/L
ALT SERPL-CCNC: 13 U/L
ANION GAP SERPL CALC-SCNC: 13 MMOL/L
AST SERPL-CCNC: 14 U/L
BASOPHILS # BLD AUTO: 0.04 K/UL
BASOPHILS NFR BLD AUTO: 0.7 %
BILIRUB SERPL-MCNC: 0.3 MG/DL
BUN SERPL-MCNC: 12 MG/DL
CALCIUM SERPL-MCNC: 9.2 MG/DL
CALCIUM SERPL-MCNC: 9.2 MG/DL
CHLORIDE SERPL-SCNC: 105 MMOL/L
CO2 SERPL-SCNC: 22 MMOL/L
CREAT SERPL-MCNC: 0.68 MG/DL
EGFR: 98 ML/MIN/1.73M2
EOSINOPHIL # BLD AUTO: 0.18 K/UL
EOSINOPHIL NFR BLD AUTO: 2.9 %
GLUCOSE SERPL-MCNC: 92 MG/DL
HCT VFR BLD CALC: 37.9 %
HGB BLD-MCNC: 12.6 G/DL
IMM GRANULOCYTES NFR BLD AUTO: 0.2 %
LYMPHOCYTES # BLD AUTO: 2.76 K/UL
LYMPHOCYTES NFR BLD AUTO: 45 %
MAN DIFF?: NORMAL
MCHC RBC-ENTMCNC: 30.6 PG
MCHC RBC-ENTMCNC: 33.2 GM/DL
MCV RBC AUTO: 92 FL
MONOCYTES # BLD AUTO: 0.55 K/UL
MONOCYTES NFR BLD AUTO: 9 %
NEUTROPHILS # BLD AUTO: 2.59 K/UL
NEUTROPHILS NFR BLD AUTO: 42.2 %
PARATHYROID HORMONE INTACT: 53 PG/ML
PHOSPHATE SERPL-MCNC: 3.8 MG/DL
PLATELET # BLD AUTO: 275 K/UL
POTASSIUM SERPL-SCNC: 4.2 MMOL/L
PROT SERPL-MCNC: 6.9 G/DL
RBC # BLD: 4.12 M/UL
RBC # FLD: 12.7 %
SODIUM SERPL-SCNC: 140 MMOL/L
TSH SERPL-ACNC: 2.11 UIU/ML
WBC # FLD AUTO: 6.13 K/UL